# Patient Record
Sex: FEMALE | Race: WHITE
[De-identification: names, ages, dates, MRNs, and addresses within clinical notes are randomized per-mention and may not be internally consistent; named-entity substitution may affect disease eponyms.]

---

## 2020-12-13 ENCOUNTER — HOSPITAL ENCOUNTER (EMERGENCY)
Dept: HOSPITAL 63 - ER | Age: 85
Discharge: HOME | End: 2020-12-13
Payer: MEDICARE

## 2020-12-13 VITALS
SYSTOLIC BLOOD PRESSURE: 140 MMHG | BODY MASS INDEX: 35.16 KG/M2 | DIASTOLIC BLOOD PRESSURE: 62 MMHG | WEIGHT: 198.42 LBS | HEIGHT: 63 IN

## 2020-12-13 DIAGNOSIS — L03.012: Primary | ICD-10-CM

## 2020-12-13 PROCEDURE — 73130 X-RAY EXAM OF HAND: CPT

## 2020-12-13 PROCEDURE — 99283 EMERGENCY DEPT VISIT LOW MDM: CPT

## 2020-12-13 PROCEDURE — 90471 IMMUNIZATION ADMIN: CPT

## 2020-12-13 PROCEDURE — 10060 I&D ABSCESS SIMPLE/SINGLE: CPT

## 2020-12-13 PROCEDURE — 90715 TDAP VACCINE 7 YRS/> IM: CPT

## 2020-12-13 NOTE — PHYS DOC
Adult General


Chief Complaint


Chief Complaint:  THUMB





HPI


HPI





Patient is a 85-year-old female presents emergency department complaining of a 

infection of her left thumb.  Patient states that 3 days ago she hit it against 

a cabinet and it lifted her nail up, she thus put her nail back down where it 

was supposed to be and covered it with a Band-Aid.  Patient states that she 

noticed her nail was not in line at the very tip so she used some scissors to 

cut it straight.  Patient states that she noticed this morning that her thumb 

was red and throbbing and swollen.  Patient denies any drainage coming from her 

thumb, patient denies any loss of sensation or loss of feeling, patient states 

that throbs and it hurts at a 10/10 on a 1-10 pain scale when it is bumped up 

against something otherwise it it is a constant 5/10 on a 1-10 pain scale.  

Patient denies any other injuries or any other illnesses.  Patient states that 

her last tetanus shot was longer than 5 years ago.  Patient denies any allergies

to medications, patient denies fever or chills, shortness of breath, chest 

pains, skin lesions, rashes of her skin.  Patient denies any COVID-19 symptoms, 

does not wish to be tested for the COVID-19 virus today.





Review of Systems


Review of Systems





14 body systems of review of systems have been reviewed.  See HPI for pertinent 

positives and negative responses, otherwise all other systems are negative, non

pertinent or noncontributory.





Current Medications


Current Medications


Patient reports taking Coumadin 5 mg daily, tramadol for chronic pains, 

Synthroid for thyroid problems.





Allergies


Allergies


Patient denies allergies to medications.





Physical Exam


Physical Exam





Constitutional: Well developed, well nourished, no acute distress, non-toxic 

appearance.  Patient is hard of hearing


HENT: Normocephalic, atraumatic, bilateral external ears normal, oropharynx 

moist, no oral exudates, nose normal. 


Eyes: PERRLA, EOMI, conjunctiva normal, no discharge.  


Neck: Normal range of motion, no tenderness, supple, no stridor.  


Cardiovascular:Heart rate regular rhythm, no murmur 


Lungs & Thorax:  Bilateral breath sounds clear to auscultation 


Abdomen: Bowel sounds normal, soft, no tenderness, no masses, no pulsatile 

masses.  


Skin: Warm, dry, no erythema, no rash.  Left thumb lateral paronychia without 

drainage, erythematous, painful to palpation, cap refill less than 2 seconds, 

full AROM/PROM of thumb MP and MIP joints, no signs of flexor tenosynovitis.


Back: No tenderness, no CVA tenderness.  


Extremities: No tenderness, no cyanosis, no clubbing, ROM intact, no edema.  


Neurologic: Alert and oriented X 3, normal motor function, normal sensory 

function, no focal deficits noted. 


Psychologic: Affect normal, judgement normal, mood normal.





EKG


EKG


[]





Radiology/Procedures


Radiology/Procedures


STATUS: REG ER            ORD. PHYSICIAN: LANIE HERNÁNDEZ


REASON: LEFT THUMB INFECTION AFTER BLUNT TRAUMA CONCERN FOR OSTEOMYLITIS


PROCEDURE: HAND LEFT 3V





3 view study of the left hand





Clinical indications: Left thumb infection after blunt trauma. Possible 

osteomyelitis.





FINDINGS: No acute fracture or dislocation or lytic process is seen. Scaphoid 

bone is intact.





IMPRESSION: No acute osseous abnormality. Diffuse soft tissue swelling of the 

right thumb is seen. No osteomyelitis is seen radiographically. No soft tissue 

air is seen.





Electronically signed by: Vamsi Foley MD (12/13/2020 4:16 PM) [id number]














DICTATED AND SIGNED BY:     VAMSI FOLEY MD


DATE:     12/13/20 1614





CC: LANIE HERNÁNDEZ; QUINTON STARKS MD; EMERGENCY,DEPARTMENT ~MTH0 0





Heart Score


Risk Factors:


Risk Factors:  DM, Current or recent (<one month) smoker, HTN, HLP, family 

history of CAD, obesity.


Risk Scores:


Risk Factors:  DM, Current or recent (<one month) smoker, HTN, HLP, family 

history of CAD, obesity.





Course & Med Decision Making


Course & Med Decision Making


Pertinent Labs and Imaging studies reviewed. (See chart for details)





85-year-old patient with an initial thumb injury 3 days ago presents emergency 

department with a paronychia of the left thumb, there is no drainage noted, 

physical appearance was erythematous in nature with swelling, no edema noted, 

cap refill was less than 2 seconds.  And x-ray of the left hand attention left 

thumb was performed to rule out osteomyelitis and foreign body related to 

initial injury from 3 days ago, read by house radiologist interpretation as 

negative for acute process.  Physical examination was consistent with an 

infectious paronychia, this is unlikely a felon, draining osteomyelitis, foreign

 body, onychomycosis, verrucae, herpetic edmund, neoplasm, small cell 

carcinoma, or malignant melanoma.  See I&D note.  Patient was soaked in warm 

water with chlorhexidine soap for 15 minutes after I&D, I&D site was dressed 

with a Band-Aid.  Approximately 2 cc of white purulent drainage was expressed 

from I&D site.  Patient was given first dose of Keflex in the emergency 

department, her tetanus immunization was brought up-to-date.  Patient gave 

verbal understanding of discharge home instructions, antibiotic use, daily soaks

 in warm soapy water twice daily, see her physician in 2 days, return to ER 

precautions, patient had no further questions or concerns, patient discharged 

home without incident.





Dragon Disclaimer


Dragon Disclaimer


This electronic medical record was generated, in whole or in part, using a voice

 recognition dictation system.





Departure


Departure:


Impression:  


   Primary Impression:  


   Paronychia of thumb, left


Disposition:  01 DC HOME SELF CARE/HOMELESS


Condition:  IMPROVED


Referrals:  


QUINTON STARKS MD (PCP)


Patient Instructions:  Paronychia





Additional Instructions:  


I have given you your first dose of 1 antibiotic in the emergency department, I 

am discharging you with prescriptions for 2 separate antibiotics.  Please take 

your antibiotics as directed until complete.  I have given you a bottle of soap 

to take home with you I would like for you to add about a tablespoon to 1 gallon

 of warm water and soak your thumb twice a day for the next 5 days even if it is

 looking much better.  You can put a Band-Aid over your thumb when not soaking, 

please return to the emergency department if you notice red streaking up your 

arm, your thumb becomes looking worse, or you have concerns about further 

infection.  Otherwise I would like for you to see your primary care doctor on 

Tuesday or Wednesday for an evaluation of this incision and drainage wound and 

thumbnail infection.





EMERGENCY DEPARTMENT GENERAL DISCHARGE INSTRUCTIONS





Thank you for coming to [facility] (ED) today and trusting us

 with you 


care.  We trust that you had a positivie experience in our Emergency Department.

  If you 


wish to speak to the department management, you may call the director at 

[phone].





YOUR FOLLOW UP INSTRUCTIONS ARE AS FOLLOWS:





1.  Do you have a private Doctor?  If you do not have a private doctor, please 

ask for a 


resource list of physicians or clinics that may be able to assist you with 

follow up care.





2.  The Emergency Physician has interpreted your x-rays.  The X-Ray specialist 

will also 


review them.  If there is a change in the findings, you will be notified in 48 

hours when at 


all possible.





3.  A lab test or culture has been done, your results will be reviewed and you 

will be 


notified if you need a change in treatment.





ADDITIONAL INSTRUCTIONS AND INFORMATION:





1.  Your care today has been supervised by a physician who is specially trained 

in emergency 


care.  Many problems require more than one evaluation for a complete diagnosis 

and 


treatment.  We recommend that you schedule your follow up appointment as 

recommended to 


ensure complete treatment of you illness or injury.  If you are unable to obtain

 follow up 


care and continue to have a problem, or if your condition worsens, we recommend 

that you 


return to the ED.





2.  We are not able to safely determine your condition over the phone nor are we

 able to 


give sound medical advice over the phone.  For these safety reasons, if you call

 for medical 


advice we will ask you to come to the ED for further evaluation.





3.  If you have any questions regarding these discharge instructions please call

 the ED at 


[phone].





SAFETY INFORMATION:





In the interest of safety, wellness, and injury prevention; we encourage you to 

wear your 


sealbelt, if you smoke; quite smoking, and we encourage family to use a 

protective helmet 


for bicycling and other sporting events that present an increased risk for head 

injury.





IF YOUR SYMPTOMS WORSEN OR NEW SYMPTOMS DEVELOP, OR YOU HAVE CONCERNS ABOUT YOUR

 CONDITION; 


OR IF YOUR CONDITION WORSENS WHILE YOU ARE WAITING FOR YOUR FOLLOW UP 

APPOINTMENT; EITHER 


CONTACT YOUR PRIMARY CARE DOCTOR, THE PHYSICIAN WHOSE NAME AND NUMBER YOU WERE 

GIVEN, OR 


RETURN TO THE ED IMMEDIATELY.


Scripts


Cephalexin (KEFLEX) 500 Mg Capsule


500 MG PO QID for THUMBNAIL INFECTION for 7 Days, #28 CAP 0 Refills


   Prov: LANIE HERNÁNDEZ         12/13/20 


Sulfamethoxazole/Trimethoprim (BACTRIM DS TABLET) 1 Each Tablet


1 TAB PO BID for THUMB NAIL INFECTION for 7 Days, #14 TAB 0 Refills


   Prov: LANIE HERNÁNDEZ         12/13/20





Incision and Drainage


Indication: Infectious paronychia





Procedure: The patient was positioned appropriately and the skin over the 

incision site was cleansed with chlorhexidine and prepped for I&D. Local 

anesthesia was accomplished with digital block using 2% lidocaine without 

epinephrine, 3 cc injected.  An incision was then made over the dorsal lateral 

nail fold of the left thumb and 2 cc white purulent drainage material was 

expressed. Loculations were disrupted with curved forceps. The drainage cavity 

was then irrigated with 240 cc of pressurized normal saline. The patients 

tetanus status was brought up-to-date in the emergency department, the left 

thumb was then soaked in chlorhexidine warm water soapy solution for 15 minutes,

 then dried, then a Band-Aid was placed over the incision site.





The patient tolerated the procedure well.





Complications: [COMPLICATIONS:] No complications noted











LANIE HERNÁNDEZ       Dec 13, 2020 15:47

## 2020-12-13 NOTE — RAD
3 view study of the left hand



Clinical indications: Left thumb infection after blunt trauma. Possible osteomyelitis.



FINDINGS: No acute fracture or dislocation or lytic process is seen. Scaphoid bone is intact.



IMPRESSION: No acute osseous abnormality. Diffuse soft tissue swelling of the right thumb is seen. No
 osteomyelitis is seen radiographically. No soft tissue air is seen.



Electronically signed by: Navin Foley MD (12/13/2020 4:16 PM) [id number]